# Patient Record
Sex: FEMALE | Race: WHITE | NOT HISPANIC OR LATINO
[De-identification: names, ages, dates, MRNs, and addresses within clinical notes are randomized per-mention and may not be internally consistent; named-entity substitution may affect disease eponyms.]

---

## 2018-03-16 ENCOUNTER — APPOINTMENT (OUTPATIENT)
Dept: GASTROENTEROLOGY | Facility: CLINIC | Age: 33
End: 2018-03-16
Payer: COMMERCIAL

## 2018-03-16 VITALS
BODY MASS INDEX: 21.6 KG/M2 | HEART RATE: 80 BPM | OXYGEN SATURATION: 99 % | HEIGHT: 60 IN | WEIGHT: 110 LBS | TEMPERATURE: 98.6 F | DIASTOLIC BLOOD PRESSURE: 70 MMHG | SYSTOLIC BLOOD PRESSURE: 110 MMHG

## 2018-03-16 DIAGNOSIS — F15.90 OTHER STIMULANT USE, UNSPECIFIED, UNCOMPLICATED: ICD-10-CM

## 2018-03-16 DIAGNOSIS — R14.0 ABDOMINAL DISTENSION (GASEOUS): ICD-10-CM

## 2018-03-16 DIAGNOSIS — Z78.9 OTHER SPECIFIED HEALTH STATUS: ICD-10-CM

## 2018-03-16 DIAGNOSIS — Z87.19 PERSONAL HISTORY OF OTHER DISEASES OF THE DIGESTIVE SYSTEM: ICD-10-CM

## 2018-03-16 DIAGNOSIS — Z83.3 FAMILY HISTORY OF DIABETES MELLITUS: ICD-10-CM

## 2018-03-16 DIAGNOSIS — F17.200 NICOTINE DEPENDENCE, UNSPECIFIED, UNCOMPLICATED: ICD-10-CM

## 2018-03-16 DIAGNOSIS — K58.2 MIXED IRRITABLE BOWEL SYNDROME: ICD-10-CM

## 2018-03-16 PROCEDURE — 99204 OFFICE O/P NEW MOD 45 MIN: CPT

## 2018-03-17 ENCOUNTER — LABORATORY RESULT (OUTPATIENT)
Age: 33
End: 2018-03-17

## 2018-03-20 PROBLEM — R14.0 ABDOMINAL BLOATING: Status: RESOLVED | Noted: 2018-03-16 | Resolved: 2018-03-20

## 2018-03-20 PROBLEM — Z78.9 DOES NOT USE ILLICIT DRUGS: Status: ACTIVE | Noted: 2018-03-16

## 2018-03-20 PROBLEM — Z83.3 FAMILY HISTORY OF DIABETES MELLITUS: Status: ACTIVE | Noted: 2018-03-16

## 2018-03-20 PROBLEM — Z87.19 HISTORY OF CHRONIC CONSTIPATION: Status: RESOLVED | Noted: 2018-03-16 | Resolved: 2018-03-20

## 2018-03-20 PROBLEM — F15.90 CAFFEINE USE: Status: ACTIVE | Noted: 2018-03-16

## 2018-03-20 PROBLEM — F17.200 CURRENT EVERY DAY SMOKER: Status: ACTIVE | Noted: 2018-03-16

## 2018-03-22 ENCOUNTER — APPOINTMENT (OUTPATIENT)
Dept: GASTROENTEROLOGY | Facility: AMBULATORY MEDICAL SERVICES | Age: 33
End: 2018-03-22
Payer: COMMERCIAL

## 2018-03-22 ENCOUNTER — OTHER (OUTPATIENT)
Age: 33
End: 2018-03-22

## 2018-03-22 PROCEDURE — 45380 COLONOSCOPY AND BIOPSY: CPT

## 2018-03-22 PROCEDURE — 43239 EGD BIOPSY SINGLE/MULTIPLE: CPT | Mod: 59

## 2018-07-23 PROBLEM — Z78.9 ALCOHOL USE: Status: ACTIVE | Noted: 2018-03-16

## 2020-09-02 ENCOUNTER — EMERGENCY (EMERGENCY)
Facility: HOSPITAL | Age: 35
LOS: 1 days | Discharge: HOME | End: 2020-09-02
Attending: EMERGENCY MEDICINE
Payer: COMMERCIAL

## 2020-09-02 VITALS
RESPIRATION RATE: 20 BRPM | WEIGHT: 117.95 LBS | HEIGHT: 60 IN | HEART RATE: 110 BPM | SYSTOLIC BLOOD PRESSURE: 140 MMHG | OXYGEN SATURATION: 100 % | DIASTOLIC BLOOD PRESSURE: 88 MMHG

## 2020-09-02 DIAGNOSIS — T74.21XA ADULT SEXUAL ABUSE, CONFIRMED, INITIAL ENCOUNTER: ICD-10-CM

## 2020-09-02 LAB
ALBUMIN SERPL ELPH-MCNC: 5 G/DL — SIGNIFICANT CHANGE UP (ref 3.3–5)
ALP SERPL-CCNC: 75 U/L — SIGNIFICANT CHANGE UP (ref 40–120)
ALT FLD-CCNC: 19 U/L — SIGNIFICANT CHANGE UP (ref 10–45)
ANION GAP SERPL CALC-SCNC: 12 MMOL/L — SIGNIFICANT CHANGE UP (ref 5–17)
AST SERPL-CCNC: 18 U/L — SIGNIFICANT CHANGE UP (ref 10–40)
BILIRUB SERPL-MCNC: 0.2 MG/DL — SIGNIFICANT CHANGE UP (ref 0.2–1.2)
BUN SERPL-MCNC: 17 MG/DL — SIGNIFICANT CHANGE UP (ref 7–23)
CALCIUM SERPL-MCNC: 9.5 MG/DL — SIGNIFICANT CHANGE UP (ref 8.4–10.5)
CHLORIDE SERPL-SCNC: 103 MMOL/L — SIGNIFICANT CHANGE UP (ref 96–108)
CO2 SERPL-SCNC: 24 MMOL/L — SIGNIFICANT CHANGE UP (ref 22–31)
CREAT SERPL-MCNC: 0.71 MG/DL — SIGNIFICANT CHANGE UP (ref 0.5–1.3)
GLUCOSE SERPL-MCNC: 88 MG/DL — SIGNIFICANT CHANGE UP (ref 70–99)
HCG UR QL: NEGATIVE — SIGNIFICANT CHANGE UP
HCT VFR BLD CALC: 39.5 % — SIGNIFICANT CHANGE UP (ref 34.5–45)
HGB BLD-MCNC: 12.8 G/DL — SIGNIFICANT CHANGE UP (ref 11.5–15.5)
HIV 1 & 2 AB SERPL IA.RAPID: SIGNIFICANT CHANGE UP
MCHC RBC-ENTMCNC: 32.1 PG — SIGNIFICANT CHANGE UP (ref 27–34)
MCHC RBC-ENTMCNC: 32.4 GM/DL — SIGNIFICANT CHANGE UP (ref 32–36)
MCV RBC AUTO: 99 FL — SIGNIFICANT CHANGE UP (ref 80–100)
NRBC # BLD: 0 /100 WBCS — SIGNIFICANT CHANGE UP (ref 0–0)
PLATELET # BLD AUTO: 260 K/UL — SIGNIFICANT CHANGE UP (ref 150–400)
POTASSIUM SERPL-MCNC: 3.9 MMOL/L — SIGNIFICANT CHANGE UP (ref 3.5–5.3)
POTASSIUM SERPL-SCNC: 3.9 MMOL/L — SIGNIFICANT CHANGE UP (ref 3.5–5.3)
PROT SERPL-MCNC: 8 G/DL — SIGNIFICANT CHANGE UP (ref 6–8.3)
RBC # BLD: 3.99 M/UL — SIGNIFICANT CHANGE UP (ref 3.8–5.2)
RBC # FLD: 11.8 % — SIGNIFICANT CHANGE UP (ref 10.3–14.5)
SODIUM SERPL-SCNC: 139 MMOL/L — SIGNIFICANT CHANGE UP (ref 135–145)
WBC # BLD: 7.13 K/UL — SIGNIFICANT CHANGE UP (ref 3.8–10.5)
WBC # FLD AUTO: 7.13 K/UL — SIGNIFICANT CHANGE UP (ref 3.8–10.5)

## 2020-09-02 PROCEDURE — 80053 COMPREHEN METABOLIC PANEL: CPT

## 2020-09-02 PROCEDURE — 86706 HEP B SURFACE ANTIBODY: CPT

## 2020-09-02 PROCEDURE — 86704 HEP B CORE ANTIBODY TOTAL: CPT

## 2020-09-02 PROCEDURE — 85027 COMPLETE CBC AUTOMATED: CPT

## 2020-09-02 PROCEDURE — 87340 HEPATITIS B SURFACE AG IA: CPT

## 2020-09-02 PROCEDURE — 86703 HIV-1/HIV-2 1 RESULT ANTBDY: CPT

## 2020-09-02 PROCEDURE — 99285 EMERGENCY DEPT VISIT HI MDM: CPT

## 2020-09-02 PROCEDURE — 80074 ACUTE HEPATITIS PANEL: CPT

## 2020-09-02 PROCEDURE — 81025 URINE PREGNANCY TEST: CPT

## 2020-09-02 NOTE — ED PROVIDER NOTE - NSFOLLOWUPCLINICS_GEN_ALL_ED_FT
[facility] General Internal Medicine  General Internal Medicine  [address] [id number]  Phone: [phone]  Fax:   Follow Up Time:

## 2020-09-02 NOTE — ED PROVIDER NOTE - PROGRESS NOTE DETAILS
Cuauhtemoc Vasquez DO PGY3 - PT does not want to initiate tx with PEP, or STI tx. Will fu with her pmd for serial testing and wants to go home
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
## 2020-09-02 NOTE — ED ADULT NURSE NOTE - OBJECTIVE STATEMENT
35 year old Female, states she wants to have a sane exam. Confirmed name and , friend at bedside. Awaiting sane nurse at this time.

## 2020-09-02 NOTE — ED PROVIDER NOTE - NSFOLLOWUPINSTRUCTIONS_ED_ALL_ED_FT
- Follow up with a primary doctor for reevaluation and serial HIV/STI testing.    - Return to the ED for any new or worsening symptoms    - If at anytime you want to be reevaluated, please return to the ED
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
## 2020-09-02 NOTE — ED PROVIDER NOTE - PATIENT PORTAL LINK FT
You can access the FollowMyHealth Patient Portal offered by [facility] by registering at the following website: [email or web address]. By joining [facility]’s FollowMyHealth portal, you will also be able to view your health information using other applications (apps) compatible with our system.

## 2020-09-03 VITALS
TEMPERATURE: 98 F | SYSTOLIC BLOOD PRESSURE: 95 MMHG | DIASTOLIC BLOOD PRESSURE: 67 MMHG | RESPIRATION RATE: 17 BRPM | HEART RATE: 76 BPM | OXYGEN SATURATION: 100 %

## 2020-09-03 LAB
HAV IGM SER-ACNC: SIGNIFICANT CHANGE UP
HBV CORE AB SER-ACNC: SIGNIFICANT CHANGE UP
HBV CORE IGM SER-ACNC: SIGNIFICANT CHANGE UP
HBV SURFACE AB SER-ACNC: ABNORMAL
HBV SURFACE AG SER-ACNC: SIGNIFICANT CHANGE UP
HBV SURFACE AG SER-ACNC: SIGNIFICANT CHANGE UP
HCV AB S/CO SERPL IA: 0.13 S/CO — SIGNIFICANT CHANGE UP (ref 0–0.99)
HCV AB SERPL-IMP: SIGNIFICANT CHANGE UP

## 2020-12-11 ENCOUNTER — APPOINTMENT (OUTPATIENT)
Dept: OTOLARYNGOLOGY | Facility: CLINIC | Age: 35
End: 2020-12-11
Payer: COMMERCIAL

## 2020-12-11 VITALS
DIASTOLIC BLOOD PRESSURE: 72 MMHG | BODY MASS INDEX: 24.54 KG/M2 | WEIGHT: 125 LBS | TEMPERATURE: 98 F | SYSTOLIC BLOOD PRESSURE: 121 MMHG | HEART RATE: 74 BPM | HEIGHT: 60 IN

## 2020-12-11 DIAGNOSIS — H61.23 IMPACTED CERUMEN, BILATERAL: ICD-10-CM

## 2020-12-11 DIAGNOSIS — J34.2 DEVIATED NASAL SEPTUM: ICD-10-CM

## 2020-12-11 PROCEDURE — 92557 COMPREHENSIVE HEARING TEST: CPT

## 2020-12-11 PROCEDURE — 92588 EVOKED AUDITORY TST COMPLETE: CPT

## 2020-12-11 PROCEDURE — 92563 TONE DECAY HEARING TEST: CPT

## 2020-12-11 PROCEDURE — 99204 OFFICE O/P NEW MOD 45 MIN: CPT | Mod: 25

## 2020-12-11 PROCEDURE — 99072 ADDL SUPL MATRL&STAF TM PHE: CPT

## 2020-12-11 PROCEDURE — 92570 ACOUSTIC IMMITANCE TESTING: CPT

## 2020-12-11 RX ORDER — AMOXICILLIN AND CLAVULANATE POTASSIUM 875; 125 MG/1; MG/1
875-125 TABLET, COATED ORAL
Qty: 20 | Refills: 0 | Status: DISCONTINUED | COMMUNITY
Start: 2017-12-05 | End: 2020-12-11

## 2020-12-11 RX ORDER — BENZONATATE 100 MG/1
100 CAPSULE ORAL
Qty: 30 | Refills: 0 | Status: DISCONTINUED | COMMUNITY
Start: 2018-02-07 | End: 2020-12-11

## 2020-12-11 RX ORDER — SIMETHICONE 250 MG/1
250 CAPSULE, GELATIN COATED ORAL 3 TIMES DAILY
Qty: 90 | Refills: 3 | Status: DISCONTINUED | OUTPATIENT
Start: 2018-03-16 | End: 2020-12-11

## 2020-12-11 RX ORDER — MULTIVITAMIN
TABLET ORAL
Refills: 0 | Status: ACTIVE | COMMUNITY
Start: 2020-12-11

## 2020-12-11 RX ORDER — OMEPRAZOLE 20 MG/1
20 CAPSULE, DELAYED RELEASE ORAL DAILY
Qty: 30 | Refills: 3 | Status: DISCONTINUED | COMMUNITY
Start: 2018-03-16 | End: 2020-12-11

## 2020-12-11 RX ORDER — OMEPRAZOLE 40 MG/1
40 CAPSULE, DELAYED RELEASE ORAL
Qty: 30 | Refills: 3 | Status: DISCONTINUED | COMMUNITY
Start: 2018-03-22 | End: 2020-12-11

## 2020-12-11 RX ORDER — BIFIDOBACTERIUM LONGUM 10MM CELL
4 CAPSULE ORAL
Qty: 30 | Refills: 3 | Status: DISCONTINUED | OUTPATIENT
Start: 2018-03-16 | End: 2020-12-11

## 2020-12-11 NOTE — ADDENDUM
[FreeTextEntry1] : Documented by Flor Bourgeois acting as scribe for Dr. Blankenship on 12/11/2020.\par \par All Medical record entries made by the Scribe were at my, Dr. Blankenship, direction and personally dictated by me on 12/11/2020 . I have reviewed the chart and agree that the record accurately reflects my personal performance of the history, physical exam, assessment and plan. I have also personally directed, reviewed, and agreed with the discharge instructions.

## 2020-12-11 NOTE — REVIEW OF SYSTEMS
[Seasonal Allergies] : seasonal allergies [Post Nasal Drip] : post nasal drip [Dizziness] : dizziness [Vertigo] : vertigo [Throat Clearing] : throat clearing [Eyes Itch] : itching of the eyes [Joint Pain] : joint pain [Easy Bruising] : a tendency for easy bruising [Negative] : Endocrine [FreeTextEntry3] : watery eyes [FreeTextEntry9] : muscle aches [FreeTextEntry1] : fatigue, sweating at night , daytime sleepiness,

## 2020-12-11 NOTE — ASSESSMENT
[FreeTextEntry1] : intermittent dizziness\par swollen gland right posterior edge of the parotid\par \par Audio: hearing nl, tymps nl, tone decay negative, OAE's nl, reflexes nl\par \par Plan:\par Audio. Trifecta. US head and soft tissue neck. FU after test.

## 2020-12-11 NOTE — HISTORY OF PRESENT ILLNESS
[de-identified] : The patient presents with intermittent dizziness since Friday. She states that it has gotten progressively worse to the point where she can't sit at her computer which she has to do for work. She feels sx mostly when sitting still. Denies any viral sx, hearing loss or tinnitus. She had similar sx in the past after coming home from a cruise. She doesn't have to turn her head to get dizzy. She has a swollen gland right posterior edge of the parotid that is painful and has been occurring intermittently for 2 years. The swelling goes up and down but she hasn't noticed if it goes up and down with food. Denies noticing a difference in hearing between the two ears.

## 2020-12-11 NOTE — PHYSICAL EXAM
[Angel Test Lateralizes To Right] : tone lateralization to the right [Midline] : trachea located in midline position [Normal] : no rashes [de-identified] : lymph node palpable [FreeTextEntry8] : minimal cerumen removed via curettage  [FreeTextEntry9] : minimal cerumen removed via curettage  [FreeTextEntry1] : severely deviated septum to the left [de-identified] : class 2 [de-identified] : leukoplakia on the cheeks [] : Romberg test is negative [de-identified] : PERRL [de-identified] : Negative head roll horizontal and vertical with eyes closed, Posiive head roll horizontal and vertical with eyes open

## 2020-12-14 ENCOUNTER — OUTPATIENT (OUTPATIENT)
Dept: OUTPATIENT SERVICES | Facility: HOSPITAL | Age: 35
LOS: 1 days | End: 2020-12-14
Payer: COMMERCIAL

## 2020-12-14 ENCOUNTER — APPOINTMENT (OUTPATIENT)
Dept: ULTRASOUND IMAGING | Facility: CLINIC | Age: 35
End: 2020-12-14
Payer: COMMERCIAL

## 2020-12-14 ENCOUNTER — RESULT REVIEW (OUTPATIENT)
Age: 35
End: 2020-12-14

## 2020-12-14 DIAGNOSIS — R22.0 LOCALIZED SWELLING, MASS AND LUMP, HEAD: ICD-10-CM

## 2020-12-14 PROCEDURE — 76536 US EXAM OF HEAD AND NECK: CPT

## 2020-12-14 PROCEDURE — 76536 US EXAM OF HEAD AND NECK: CPT | Mod: 26

## 2020-12-15 ENCOUNTER — APPOINTMENT (OUTPATIENT)
Dept: OTOLARYNGOLOGY | Facility: CLINIC | Age: 35
End: 2020-12-15
Payer: COMMERCIAL

## 2020-12-15 PROCEDURE — 92585: CPT

## 2020-12-15 PROCEDURE — 92540 BASIC VESTIBULAR EVALUATION: CPT

## 2020-12-15 PROCEDURE — 99072 ADDL SUPL MATRL&STAF TM PHE: CPT

## 2020-12-15 PROCEDURE — 92584 ELECTROCOCHLEOGRAPHY: CPT

## 2020-12-15 PROCEDURE — 92537 CALORIC VSTBLR TEST W/REC: CPT

## 2020-12-17 ENCOUNTER — APPOINTMENT (OUTPATIENT)
Dept: OTOLARYNGOLOGY | Facility: CLINIC | Age: 35
End: 2020-12-17
Payer: COMMERCIAL

## 2020-12-17 DIAGNOSIS — R22.1 LOCALIZED SWELLING, MASS AND LUMP, HEAD: ICD-10-CM

## 2020-12-17 DIAGNOSIS — R22.0 LOCALIZED SWELLING, MASS AND LUMP, HEAD: ICD-10-CM

## 2020-12-17 DIAGNOSIS — R59.0 LOCALIZED ENLARGED LYMPH NODES: ICD-10-CM

## 2020-12-17 PROCEDURE — 99214 OFFICE O/P EST MOD 30 MIN: CPT | Mod: 95

## 2020-12-17 NOTE — ASSESSMENT
[FreeTextEntry1] : Pt remains with dizziness intermittently/ Thinks it started after eyelash extensions were applied.. has had no effect on vision. Trifecta nl. sonogram of parotid-benign appearing lymph node. Mother with rheumatoid. no family hx of migraine.  no specific etilogy for dizzines. Discussed keeping a food diary.  Will obtain lab eval. if neg consider mri mra.

## 2020-12-17 NOTE — REASON FOR VISIT
[Home] : at home, [unfilled] , at the time of the visit. [Medical Office: ([facility])___] : at the medical office located in  [Subsequent Evaluation] : a subsequent evaluation for

## 2021-01-14 ENCOUNTER — TRANSCRIPTION ENCOUNTER (OUTPATIENT)
Age: 36
End: 2021-01-14

## 2022-03-23 ENCOUNTER — APPOINTMENT (OUTPATIENT)
Dept: OBGYN | Facility: CLINIC | Age: 37
End: 2022-03-23
Payer: COMMERCIAL

## 2022-03-23 VITALS
HEIGHT: 60 IN | SYSTOLIC BLOOD PRESSURE: 123 MMHG | BODY MASS INDEX: 26.5 KG/M2 | WEIGHT: 135 LBS | DIASTOLIC BLOOD PRESSURE: 81 MMHG | HEART RATE: 65 BPM

## 2022-03-23 PROCEDURE — 36415 COLL VENOUS BLD VENIPUNCTURE: CPT

## 2022-03-23 PROCEDURE — 99385 PREV VISIT NEW AGE 18-39: CPT
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
## 2022-03-23 NOTE — PLAN
[FreeTextEntry1] : 37 yo  LMP 22, annual exam.\par \par Annual\par -pap done today \par -discussed options for PMS symptoms which include OCP versus SSRIs\par -discussed egg freezing\par -f/u PCP for annual and appropriate immunizations\par -rto 1 year. \par \par STD Screening\par -GC/CT\par -STD blood panel

## 2022-03-23 NOTE — END OF VISIT
[FreeTextEntry3] : I, Ban Basurto, acted as a scribe on behalf for Dr. Natalie Chavez on 03/23/2022.\par \par All medical entries made by the scribe were at my, Dr. Natalie Chavez, direction and personally dictated by me on 03/23/2022. I have reviewed the chart and agree that the record accurately reflects my personal performance of the history, physical exam, assessment, and plan. I have personally directed, reviewed, and agreed with the chart.

## 2022-03-24 LAB
HBV SURFACE AG SER QL: NONREACTIVE
HCV AB SER QL: NONREACTIVE
HCV S/CO RATIO: 0.16 S/CO
HIV1+2 AB SPEC QL IA.RAPID: NONREACTIVE
T PALLIDUM AB SER QL IA: NEGATIVE

## 2022-03-25 ENCOUNTER — NON-APPOINTMENT (OUTPATIENT)
Age: 37
End: 2022-03-25

## 2022-03-25 LAB
C TRACH RRNA SPEC QL NAA+PROBE: NOT DETECTED
N GONORRHOEA RRNA SPEC QL NAA+PROBE: NOT DETECTED
SOURCE AMPLIFICATION: NORMAL

## 2022-03-30 LAB — HPV HIGH+LOW RISK DNA PNL CVX: NOT DETECTED

## 2022-07-13 ENCOUNTER — APPOINTMENT (OUTPATIENT)
Dept: INTERNAL MEDICINE | Facility: CLINIC | Age: 37
End: 2022-07-13

## 2022-07-26 ENCOUNTER — NON-APPOINTMENT (OUTPATIENT)
Age: 37
End: 2022-07-26

## 2022-07-26 ENCOUNTER — LABORATORY RESULT (OUTPATIENT)
Age: 37
End: 2022-07-26

## 2022-07-26 ENCOUNTER — APPOINTMENT (OUTPATIENT)
Dept: INTERNAL MEDICINE | Facility: CLINIC | Age: 37
End: 2022-07-26

## 2022-07-26 VITALS
DIASTOLIC BLOOD PRESSURE: 83 MMHG | TEMPERATURE: 98.8 F | BODY MASS INDEX: 25.52 KG/M2 | OXYGEN SATURATION: 98 % | HEART RATE: 96 BPM | SYSTOLIC BLOOD PRESSURE: 128 MMHG | RESPIRATION RATE: 16 BRPM | HEIGHT: 60 IN | WEIGHT: 130 LBS

## 2022-07-26 DIAGNOSIS — M54.9 DORSALGIA, UNSPECIFIED: ICD-10-CM

## 2022-07-26 DIAGNOSIS — Z87.898 PERSONAL HISTORY OF OTHER SPECIFIED CONDITIONS: ICD-10-CM

## 2022-07-26 DIAGNOSIS — R05.8 OTHER SPECIFIED COUGH: ICD-10-CM

## 2022-07-26 DIAGNOSIS — Z80.1 FAMILY HISTORY OF MALIGNANT NEOPLASM OF TRACHEA, BRONCHUS AND LUNG: ICD-10-CM

## 2022-07-26 DIAGNOSIS — R14.0 ABDOMINAL DISTENSION (GASEOUS): ICD-10-CM

## 2022-07-26 DIAGNOSIS — K25.3 ACUTE GASTRIC ULCER W/OUT HEMORRHAGE OR PERFORATION: ICD-10-CM

## 2022-07-26 DIAGNOSIS — Z83.49 FAMILY HISTORY OF OTHER ENDOCRINE, NUTRITIONAL AND METABOLIC DISEASES: ICD-10-CM

## 2022-07-26 DIAGNOSIS — R14.1 GAS PAIN: ICD-10-CM

## 2022-07-26 DIAGNOSIS — R29.898 OTHER SYMPTOMS AND SIGNS INVOLVING THE MUSCULOSKELETAL SYSTEM: ICD-10-CM

## 2022-07-26 PROCEDURE — 36415 COLL VENOUS BLD VENIPUNCTURE: CPT

## 2022-07-26 PROCEDURE — 99385 PREV VISIT NEW AGE 18-39: CPT | Mod: 25

## 2022-07-26 RX ORDER — AMOXICILLIN 875 MG/1
875 TABLET, FILM COATED ORAL
Qty: 20 | Refills: 0 | Status: DISCONTINUED | COMMUNITY
Start: 2022-05-08

## 2022-07-26 NOTE — HISTORY OF PRESENT ILLNESS
[de-identified] : new pt referred by co worker Glenis \par got health insurance recently has not had a check up in few yrs \par \par started new job in IT\par \par c/o creaking in her right shoulder x 3 yrs now - had done massages and compresses no help \par her back upper is also hurting she has big breast \par -mother has hx or RA - wants to get checked \par works in [employer] company \par \par got covid in 5/2022 - no residual \par - cough 1 week dry

## 2022-07-26 NOTE — ASSESSMENT
[FreeTextEntry1] : right shoulder creaking sounds chronic - malaise fatigue , upper back pain -ch 3 yrs \par - get arthritis panel , b12 , vit d\par -ortho eval \par \par post viral cough \par - trial of Tessalon pearls \par \par hx IBS - get celiac titres \par \par hcm \par pap- 3/2022 \par STD 3/2022 neg \par Tdap-UTD\par Covid -moderna- 10/22/21, 11/19/21, booster advised \par

## 2022-07-26 NOTE — HEALTH RISK ASSESSMENT
[Good] : ~his/her~  mood as  good [Never] : Never [Yes] : Yes [2 - 4 times a month (2 pts)] : 2-4 times a month (2 points) [Never (0 pts)] : Never (0 points) [No] : In the past 12 months have you used drugs other than those required for medical reasons? 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

## 2022-07-28 ENCOUNTER — TRANSCRIPTION ENCOUNTER (OUTPATIENT)
Age: 37
End: 2022-07-28

## 2022-07-28 LAB
25(OH)D3 SERPL-MCNC: 31 NG/ML
ABO + RH PNL BLD: NORMAL
ALBUMIN SERPL ELPH-MCNC: 5.1 G/DL
ALP BLD-CCNC: 93 U/L
ALT SERPL-CCNC: 18 U/L
ANION GAP SERPL CALC-SCNC: 18 MMOL/L
APPEARANCE: CLEAR
AST SERPL-CCNC: 25 U/L
BASOPHILS # BLD AUTO: 0.05 K/UL
BASOPHILS NFR BLD AUTO: 0.8 %
BILIRUB SERPL-MCNC: 0.3 MG/DL
BILIRUBIN URINE: NEGATIVE
BLOOD URINE: NEGATIVE
BUN SERPL-MCNC: 16 MG/DL
C TRACH RRNA SPEC QL NAA+PROBE: NOT DETECTED
CALCIUM SERPL-MCNC: 9.9 MG/DL
CCP AB SER IA-ACNC: <8 UNITS
CHLORIDE SERPL-SCNC: 102 MMOL/L
CHOLEST SERPL-MCNC: 198 MG/DL
CO2 SERPL-SCNC: 23 MMOL/L
COLOR: NORMAL
CREAT SERPL-MCNC: 0.73 MG/DL
CRP SERPL HS-MCNC: 3.21 MG/L
EGFR: 110 ML/MIN/1.73M2
EOSINOPHIL # BLD AUTO: 0.37 K/UL
EOSINOPHIL NFR BLD AUTO: 5.7 %
ERYTHROCYTE [SEDIMENTATION RATE] IN BLOOD BY WESTERGREN METHOD: 34 MM/HR
ESTIMATED AVERAGE GLUCOSE: 97 MG/DL
GLUCOSE QUALITATIVE U: NEGATIVE
GLUCOSE SERPL-MCNC: 94 MG/DL
HBA1C MFR BLD HPLC: 5 %
HCT VFR BLD CALC: 38.3 %
HDLC SERPL-MCNC: 98 MG/DL
HGB BLD-MCNC: 12.5 G/DL
IMM GRANULOCYTES NFR BLD AUTO: 0.2 %
KETONES URINE: NEGATIVE
LDLC SERPL CALC-MCNC: 86 MG/DL
LEUKOCYTE ESTERASE URINE: NEGATIVE
LYMPHOCYTES # BLD AUTO: 2.34 K/UL
LYMPHOCYTES NFR BLD AUTO: 36.2 %
MAN DIFF?: NORMAL
MCHC RBC-ENTMCNC: 32.6 GM/DL
MCHC RBC-ENTMCNC: 32.6 PG
MCV RBC AUTO: 99.7 FL
MONOCYTES # BLD AUTO: 0.71 K/UL
MONOCYTES NFR BLD AUTO: 11 %
N GONORRHOEA RRNA SPEC QL NAA+PROBE: NOT DETECTED
NEUTROPHILS # BLD AUTO: 2.98 K/UL
NEUTROPHILS NFR BLD AUTO: 46.1 %
NITRITE URINE: NEGATIVE
NONHDLC SERPL-MCNC: 100 MG/DL
PH URINE: 6.5
PLATELET # BLD AUTO: 271 K/UL
POTASSIUM SERPL-SCNC: 4.1 MMOL/L
PROT SERPL-MCNC: 8 G/DL
PROTEIN URINE: NEGATIVE
RBC # BLD: 3.84 M/UL
RBC # FLD: 13.5 %
RF+CCP IGG SER-IMP: NEGATIVE
RHEUMATOID FACT SER QL: <10 IU/ML
SODIUM SERPL-SCNC: 142 MMOL/L
SOURCE AMPLIFICATION: NORMAL
SPECIFIC GRAVITY URINE: 1.02
TRIGL SERPL-MCNC: 66 MG/DL
TSH SERPL-ACNC: 1.78 UIU/ML
URATE SERPL-MCNC: 5.2 MG/DL
UROBILINOGEN URINE: NORMAL
VIT B12 SERPL-MCNC: 390 PG/ML
WBC # FLD AUTO: 6.46 K/UL

## 2022-07-29 LAB — DSDNA AB SER-ACNC: <12 IU/ML

## 2022-08-01 LAB
ANA SER IF-ACNC: NEGATIVE
CELIAC DISEASE INTERPRETATION: NORMAL
CELIAC GENE PAIRS PRESENT: YES
DQ ALPHA 1: NORMAL
DQ BETA 1: NORMAL
IMMUNOGLOBULIN A (IGA): 196 MG/DL

## 2022-08-09 ENCOUNTER — APPOINTMENT (OUTPATIENT)
Dept: ORTHOPEDIC SURGERY | Facility: CLINIC | Age: 37
End: 2022-08-09

## 2022-08-09 ENCOUNTER — NON-APPOINTMENT (OUTPATIENT)
Age: 37
End: 2022-08-09

## 2022-08-09 VITALS
OXYGEN SATURATION: 98 % | DIASTOLIC BLOOD PRESSURE: 75 MMHG | WEIGHT: 130 LBS | SYSTOLIC BLOOD PRESSURE: 120 MMHG | HEIGHT: 60 IN | HEART RATE: 73 BPM | BODY MASS INDEX: 25.52 KG/M2

## 2022-08-09 DIAGNOSIS — M25.511 PAIN IN RIGHT SHOULDER: ICD-10-CM

## 2022-08-09 DIAGNOSIS — M79.89 OTHER SPECIFIED SOFT TISSUE DISORDERS: ICD-10-CM

## 2022-08-09 PROCEDURE — 99204 OFFICE O/P NEW MOD 45 MIN: CPT

## 2022-08-09 PROCEDURE — 73030 X-RAY EXAM OF SHOULDER: CPT | Mod: RT

## 2022-09-20 ENCOUNTER — APPOINTMENT (OUTPATIENT)
Dept: ORTHOPEDIC SURGERY | Facility: CLINIC | Age: 37
End: 2022-09-20

## 2023-09-06 ENCOUNTER — APPOINTMENT (OUTPATIENT)
Dept: OBGYN | Facility: CLINIC | Age: 38
End: 2023-09-06
Payer: COMMERCIAL

## 2023-09-06 VITALS
SYSTOLIC BLOOD PRESSURE: 114 MMHG | DIASTOLIC BLOOD PRESSURE: 77 MMHG | HEIGHT: 60 IN | WEIGHT: 117 LBS | BODY MASS INDEX: 22.97 KG/M2

## 2023-09-06 DIAGNOSIS — Z01.419 ENCOUNTER FOR GYNECOLOGICAL EXAMINATION (GENERAL) (ROUTINE) W/OUT ABNORMAL FINDINGS: ICD-10-CM

## 2023-09-06 PROCEDURE — 99395 PREV VISIT EST AGE 18-39: CPT

## 2023-09-06 PROCEDURE — 36415 COLL VENOUS BLD VENIPUNCTURE: CPT

## 2023-09-06 NOTE — END OF VISIT
[FreeTextEntry3] : This note was written by Tang Segundo on 09/06/2023 actively solely ТАТЬЯНА Bender M.D.  All medical record entries made by the Scribe were at my, ТАТЬЯНА Bender M.D. direction and personally dictated by me on 09/06/2023. I have personally reviewed the chart and agree that the record reflects my personal performance of the history, physical exam, assessment, and plan.

## 2023-09-06 NOTE — PLAN
[FreeTextEntry1] : 38 year old female pt presents for annual gyn exam Health Maintenance:  BSE taught Reviewed diet and exercise Breast and pelvic exam performed Pap/GC/CT conducted STD panel today - per pts request  Advised pt to see PCP annually.  Premenstrual sx: Advised pt to continue otc progesterone.   Anorgasmia: Referred pt  therapist.  R/B advised   RTO in 1 year or PRN

## 2023-09-06 NOTE — HISTORY OF PRESENT ILLNESS
[FreeTextEntry1] :  38 year old female  LMP 08/10, presents for annual gyn exam.She reports that the progesterone helped with her sx but the price wasn't reasonable.She now uses an OTC natural form and barely gets breakthrough premenstrual sx. She reports monthly menses, not too heavy, not painful. She denies intermenstrual bleeding.She denies abdominal and pelvic pain. No vaginal discharge or vaginitis symptoms. No urinary complaints. BM is normal per patient.  Pt is sexually active for the first time in 2 years. She reports Anorgasmia.  FHx: heart disease (father) Allergies: Sulfa

## 2023-09-07 LAB
C TRACH RRNA SPEC QL NAA+PROBE: NOT DETECTED
HBV SURFACE AG SER QL: NONREACTIVE
HCV AB SER QL: NONREACTIVE
HCV S/CO RATIO: 0.08 S/CO
HIV1+2 AB SPEC QL IA.RAPID: NONREACTIVE
HPV HIGH+LOW RISK DNA PNL CVX: NOT DETECTED
N GONORRHOEA RRNA SPEC QL NAA+PROBE: NOT DETECTED
SOURCE TP AMPLIFICATION: NORMAL
T PALLIDUM AB SER QL IA: NEGATIVE

## 2023-09-11 LAB — CYTOLOGY CVX/VAG DOC THIN PREP: NORMAL

## 2023-12-11 ENCOUNTER — APPOINTMENT (OUTPATIENT)
Dept: INTERNAL MEDICINE | Facility: CLINIC | Age: 38
End: 2023-12-11
Payer: COMMERCIAL

## 2023-12-11 VITALS
WEIGHT: 123 LBS | DIASTOLIC BLOOD PRESSURE: 72 MMHG | BODY MASS INDEX: 24.02 KG/M2 | SYSTOLIC BLOOD PRESSURE: 110 MMHG | HEART RATE: 82 BPM | OXYGEN SATURATION: 98 %

## 2023-12-11 DIAGNOSIS — F43.0 ACUTE STRESS REACTION: ICD-10-CM

## 2023-12-11 DIAGNOSIS — Z00.00 ENCOUNTER FOR GENERAL ADULT MEDICAL EXAMINATION W/OUT ABNORMAL FINDINGS: ICD-10-CM

## 2023-12-11 DIAGNOSIS — R53.81 OTHER MALAISE: ICD-10-CM

## 2023-12-11 DIAGNOSIS — R20.0 ANESTHESIA OF SKIN: ICD-10-CM

## 2023-12-11 DIAGNOSIS — R53.83 OTHER MALAISE: ICD-10-CM

## 2023-12-11 PROCEDURE — 99395 PREV VISIT EST AGE 18-39: CPT

## 2023-12-11 RX ORDER — DICLOFENAC SODIUM 50 MG/1
50 TABLET, DELAYED RELEASE ORAL
Qty: 28 | Refills: 0 | Status: DISCONTINUED | COMMUNITY
Start: 2022-08-09 | End: 2023-12-11

## 2023-12-11 RX ORDER — KETOCONAZOLE 20.5 MG/ML
2 SHAMPOO, SUSPENSION TOPICAL
Qty: 120 | Refills: 0 | Status: DISCONTINUED | COMMUNITY
Start: 2022-06-14 | End: 2023-12-11

## 2023-12-11 RX ORDER — BENZONATATE 100 MG/1
100 CAPSULE ORAL 3 TIMES DAILY
Qty: 42 | Refills: 0 | Status: DISCONTINUED | COMMUNITY
Start: 2022-07-26 | End: 2023-12-11

## 2023-12-11 RX ORDER — FLUOCINONIDE 0.5 MG/ML
0.05 SOLUTION TOPICAL
Qty: 60 | Refills: 0 | Status: DISCONTINUED | COMMUNITY
Start: 2022-06-14 | End: 2023-12-11

## 2023-12-11 RX ORDER — BUSPIRONE HYDROCHLORIDE 7.5 MG/1
7.5 TABLET ORAL
Qty: 90 | Refills: 1 | Status: ACTIVE | COMMUNITY
Start: 2023-12-11 | End: 1900-01-01

## 2023-12-18 ENCOUNTER — TRANSCRIPTION ENCOUNTER (OUTPATIENT)
Age: 38
End: 2023-12-18

## 2023-12-18 LAB
25(OH)D3 SERPL-MCNC: 28.1 NG/ML
ALBUMIN SERPL ELPH-MCNC: 4.6 G/DL
ALP BLD-CCNC: 77 U/L
ALT SERPL-CCNC: 16 U/L
ANA SER IF-ACNC: NEGATIVE
ANION GAP SERPL CALC-SCNC: 10 MMOL/L
AST SERPL-CCNC: 19 U/L
BASOPHILS # BLD AUTO: 0.06 K/UL
BASOPHILS NFR BLD AUTO: 1.3 %
BILIRUB SERPL-MCNC: 0.2 MG/DL
BUN SERPL-MCNC: 16 MG/DL
CALCIUM SERPL-MCNC: 9.5 MG/DL
CCP AB SER IA-ACNC: <8 UNITS
CHLORIDE SERPL-SCNC: 102 MMOL/L
CHOLEST SERPL-MCNC: 179 MG/DL
CO2 SERPL-SCNC: 27 MMOL/L
CREAT SERPL-MCNC: 0.78 MG/DL
CRP SERPL-MCNC: <3 MG/L
EGFR: 101 ML/MIN/1.73M2
EOSINOPHIL # BLD AUTO: 0.35 K/UL
EOSINOPHIL NFR BLD AUTO: 7.6 %
ERYTHROCYTE [SEDIMENTATION RATE] IN BLOOD BY WESTERGREN METHOD: 13 MM/HR
ESTIMATED AVERAGE GLUCOSE: 100 MG/DL
FERRITIN SERPL-MCNC: 39 NG/ML
FOLATE SERPL-MCNC: 17.2 NG/ML
GLUCOSE SERPL-MCNC: 84 MG/DL
HBA1C MFR BLD HPLC: 5.1 %
HCT VFR BLD CALC: 37.3 %
HDLC SERPL-MCNC: 82 MG/DL
HGB BLD-MCNC: 11.9 G/DL
IMM GRANULOCYTES NFR BLD AUTO: 0.2 %
IRON SATN MFR SERPL: 44 %
IRON SERPL-MCNC: 123 UG/DL
LDLC SERPL CALC-MCNC: 85 MG/DL
LYMPHOCYTES # BLD AUTO: 2.14 K/UL
LYMPHOCYTES NFR BLD AUTO: 46.6 %
MAGNESIUM SERPL-MCNC: 2.1 MG/DL
MAN DIFF?: NORMAL
MCHC RBC-ENTMCNC: 31.7 PG
MCHC RBC-ENTMCNC: 31.9 GM/DL
MCV RBC AUTO: 99.5 FL
MONOCYTES # BLD AUTO: 0.51 K/UL
MONOCYTES NFR BLD AUTO: 11.1 %
NEUTROPHILS # BLD AUTO: 1.52 K/UL
NEUTROPHILS NFR BLD AUTO: 33.2 %
NONHDLC SERPL-MCNC: 97 MG/DL
PLATELET # BLD AUTO: 208 K/UL
POTASSIUM SERPL-SCNC: 4.4 MMOL/L
PROT SERPL-MCNC: 7.3 G/DL
RBC # BLD: 3.75 M/UL
RBC # FLD: 12.6 %
RF+CCP IGG SER-IMP: NEGATIVE
RHEUMATOID FACT SER QL: <10 IU/ML
SODIUM SERPL-SCNC: 139 MMOL/L
TIBC SERPL-MCNC: 280 UG/DL
TRIGL SERPL-MCNC: 63 MG/DL
TSH SERPL-ACNC: 1.61 UIU/ML
UIBC SERPL-MCNC: 157 UG/DL
VIT B12 SERPL-MCNC: 538 PG/ML
WBC # FLD AUTO: 4.59 K/UL

## 2024-01-04 ENCOUNTER — TRANSCRIPTION ENCOUNTER (OUTPATIENT)
Age: 39
End: 2024-01-04

## 2024-03-19 ENCOUNTER — TRANSCRIPTION ENCOUNTER (OUTPATIENT)
Age: 39
End: 2024-03-19

## 2024-12-25 PROBLEM — F10.90 ALCOHOL USE: Status: ACTIVE | Noted: 2018-03-16
